# Patient Record
Sex: MALE | Race: WHITE
[De-identification: names, ages, dates, MRNs, and addresses within clinical notes are randomized per-mention and may not be internally consistent; named-entity substitution may affect disease eponyms.]

---

## 2020-08-31 ENCOUNTER — HOSPITAL ENCOUNTER (EMERGENCY)
Dept: HOSPITAL 11 - JP.ED | Age: 60
Discharge: HOME | End: 2020-08-31
Payer: SELF-PAY

## 2020-08-31 DIAGNOSIS — F17.210: ICD-10-CM

## 2020-08-31 DIAGNOSIS — R60.0: ICD-10-CM

## 2020-08-31 DIAGNOSIS — Z48.00: Primary | ICD-10-CM

## 2020-08-31 NOTE — EDM.PDOC
ED HPI GENERAL MEDICAL PROBLEM





- General


Chief Complaint: Wound Recheck


Stated Complaint: LEFT LEG ABOVE ANKLE RECHECK INFECTION


Time Seen by Provider: 08/31/20 16:14


Source of Information: Reports: Patient, RN Notes Reviewed


History Limitations: Reports: No Limitations





- History of Present Illness


INITIAL COMMENTS - FREE TEXT/NARRATIVE: 





60-year-old gentleman presents emergency department today for wound check, he 

was admitted to the hospital in [place] last week had asked him to follow-up 

with his primary care for wound check however he is homeless and he happens to 

be traveling to the area though he thought he stop into the emergency department

to have his wound rechecked.  He was admitted to the hospital for cellulitis 

left lower extremity





- Related Data


                                    Allergies











Allergy/AdvReac Type Severity Reaction Status Date / Time


 


No Known Allergies Allergy   Verified 08/31/20 15:21











Home Meds: 


                                    Home Meds





NK [No Known Home Meds]  08/31/20 [History]











Past Medical History


Dermatologic History: Reports: Cellulitis





- Infectious Disease History


Infectious Disease History: Reports: Chicken Pox





Social & Family History





- Tobacco Use


Smoking Status *Q: Current Every Day Smoker


Years of Tobacco use: 35


Packs/Tins Daily: 0.5





- Caffeine Use


Caffeine Use: Reports: Coffee, Soda





- Recreational Drug Use


Recreational Drug Use: No





ED ROS GENERAL





- Review of Systems


Review Of Systems: See Below


Constitutional: Reports: No Symptoms


Skin: Reports: Wound





ED EXAM, GENERAL





- Physical Exam


Exam: See Below


Free Text/Narrative:: 





Examination of the lower extremity shows a healing wound consistent with a 

venous stasis ulcer type wound he does have +2 pitting edema bilaterally


Exam Limited By: No Limitations


General Appearance: Alert, WD/WN, No Apparent Distress





Course





- Vital Signs


Last Recorded V/S: 





                                Last Vital Signs











Temp  97.5 F   08/31/20 15:25


 


Pulse  78   08/31/20 15:25


 


Resp  16   08/31/20 15:25


 


BP  146/85 H  08/31/20 15:25


 


Pulse Ox  98   08/31/20 15:25














Departure





- Departure


Time of Disposition: 16:20


Disposition: Home, Self-Care 01


Condition: Poor


Clinical Impression: 


 Visit for wound check








- Discharge Information


Referrals: 


PCP,None [Primary Care Provider] - 


Additional Instructions: 


Recommend obtaining compression hose at the pharmacy, continue to use them to 

relieve the pressure of your lower extremities, please follow-up with your 

primary care for further evaluation





Sepsis Event Note (ED)





- Evaluation


Sepsis Screening Result: No Definite Risk





- Focused Exam


Vital Signs: 





                                   Vital Signs











  Temp Pulse Resp BP Pulse Ox


 


 08/31/20 15:25  97.5 F  78  16  146/85 H  98


 


 08/31/20 15:15  97.5 F  78  16  146/85 H  98














- Assessment/Plan


Plan: 





Assessment





Acuity = acute





Site and laterality = wound check





Etiology  = venous stasis dermatitis





Manifestations = none





Location of injury =  Home





Lab values = none





Plan


Recommend compression hose to help relieve some of the pressure on his skin good

follow-up with a primary care however he is homeless this is difficult at this 

time

















 This note was dictated using dragon voice recognition software please call with

any questions on syntax or grammar.